# Patient Record
Sex: FEMALE | Race: WHITE
[De-identification: names, ages, dates, MRNs, and addresses within clinical notes are randomized per-mention and may not be internally consistent; named-entity substitution may affect disease eponyms.]

---

## 2017-04-17 ENCOUNTER — RX ONLY (OUTPATIENT)
Age: 62
Setting detail: RX ONLY
End: 2017-04-17

## 2017-04-17 RX ORDER — AZELAIC ACID 0.15 G/G
GEL TOPICAL
Qty: 50 | Refills: 5

## 2017-08-10 ENCOUNTER — APPOINTMENT (RX ONLY)
Dept: URBAN - METROPOLITAN AREA CLINIC 122 | Facility: CLINIC | Age: 62
Setting detail: DERMATOLOGY
End: 2017-08-10

## 2017-08-10 DIAGNOSIS — B35.1 TINEA UNGUIUM: ICD-10-CM

## 2017-08-10 DIAGNOSIS — L71.8 OTHER ROSACEA: ICD-10-CM

## 2017-08-10 PROCEDURE — 99213 OFFICE O/P EST LOW 20 MIN: CPT

## 2017-08-10 PROCEDURE — ? PRESCRIPTION

## 2017-08-10 PROCEDURE — ? TREATMENT REGIMEN

## 2017-08-10 RX ORDER — TERBINAFINE HCL 250 MG
TABLET ORAL
Qty: 30 | Refills: 2 | Status: ERX | COMMUNITY
Start: 2017-08-10

## 2017-08-10 RX ADMIN — Medication 1: at 00:00

## 2018-08-13 ENCOUNTER — APPOINTMENT (RX ONLY)
Dept: URBAN - METROPOLITAN AREA CLINIC 122 | Facility: CLINIC | Age: 63
Setting detail: DERMATOLOGY
End: 2018-08-13

## 2018-08-13 DIAGNOSIS — L71.8 OTHER ROSACEA: ICD-10-CM

## 2018-08-13 DIAGNOSIS — B35.1 TINEA UNGUIUM: ICD-10-CM | Status: RESOLVED

## 2018-08-13 DIAGNOSIS — L82.1 OTHER SEBORRHEIC KERATOSIS: ICD-10-CM

## 2018-08-13 PROBLEM — E78.5 HYPERLIPIDEMIA, UNSPECIFIED: Status: ACTIVE | Noted: 2018-08-13

## 2018-08-13 PROBLEM — L70.0 ACNE VULGARIS: Status: ACTIVE | Noted: 2018-08-13

## 2018-08-13 PROBLEM — L29.8 OTHER PRURITUS: Status: ACTIVE | Noted: 2018-08-13

## 2018-08-13 PROCEDURE — ? TREATMENT REGIMEN

## 2018-08-13 PROCEDURE — 99213 OFFICE O/P EST LOW 20 MIN: CPT

## 2018-08-13 PROCEDURE — ? COUNSELING

## 2018-08-13 ASSESSMENT — LOCATION SIMPLE DESCRIPTION DERM
LOCATION SIMPLE: CHEST
LOCATION SIMPLE: RIGHT UPPER ARM
LOCATION SIMPLE: RIGHT FOREHEAD

## 2018-08-13 ASSESSMENT — LOCATION DETAILED DESCRIPTION DERM
LOCATION DETAILED: LEFT LATERAL SUPERIOR CHEST
LOCATION DETAILED: RIGHT ANTERIOR PROXIMAL UPPER ARM
LOCATION DETAILED: RIGHT FOREHEAD

## 2018-08-13 ASSESSMENT — LOCATION ZONE DERM
LOCATION ZONE: ARM
LOCATION ZONE: TRUNK
LOCATION ZONE: FACE

## 2019-08-13 ENCOUNTER — APPOINTMENT (RX ONLY)
Dept: URBAN - METROPOLITAN AREA CLINIC 122 | Facility: CLINIC | Age: 64
Setting detail: DERMATOLOGY
End: 2019-08-13

## 2019-08-13 DIAGNOSIS — D49.2 NEOPLASM OF UNSPECIFIED BEHAVIOR OF BONE, SOFT TISSUE, AND SKIN: ICD-10-CM

## 2019-08-13 DIAGNOSIS — B35.1 TINEA UNGUIUM: ICD-10-CM

## 2019-08-13 DIAGNOSIS — L71.8 OTHER ROSACEA: ICD-10-CM

## 2019-08-13 PROBLEM — J30.1 ALLERGIC RHINITIS DUE TO POLLEN: Status: ACTIVE | Noted: 2019-08-13

## 2019-08-13 PROBLEM — I48.91 UNSPECIFIED ATRIAL FIBRILLATION: Status: ACTIVE | Noted: 2019-08-13

## 2019-08-13 PROBLEM — E78.5 HYPERLIPIDEMIA, UNSPECIFIED: Status: ACTIVE | Noted: 2019-08-13

## 2019-08-13 PROBLEM — L29.8 OTHER PRURITUS: Status: ACTIVE | Noted: 2019-08-13

## 2019-08-13 PROCEDURE — ? TREATMENT REGIMEN

## 2019-08-13 PROCEDURE — ? SHAVE REMOVAL

## 2019-08-13 PROCEDURE — 99213 OFFICE O/P EST LOW 20 MIN: CPT | Mod: 25

## 2019-08-13 PROCEDURE — ? PRESCRIPTION

## 2019-08-13 PROCEDURE — 11310 SHAVE SKIN LESION 0.5 CM/<: CPT

## 2019-08-13 RX ORDER — TERBINAFINE HYDROCHLORIDE 250 MG/1
ONE TABLET ORAL DAILY
Qty: 30 | Refills: 2 | Status: ERX

## 2019-08-13 ASSESSMENT — LOCATION ZONE DERM: LOCATION ZONE: FACE

## 2019-08-13 ASSESSMENT — LOCATION SIMPLE DESCRIPTION DERM: LOCATION SIMPLE: RIGHT EYEBROW

## 2019-08-13 ASSESSMENT — LOCATION DETAILED DESCRIPTION DERM: LOCATION DETAILED: RIGHT CENTRAL EYEBROW

## 2019-08-13 NOTE — HPI: EVALUATION OF SKIN LESION(S)
What Type Of Note Output Would You Prefer (Optional)?: Standard Output
How Severe Are Your Spot(S)?: mild
Have Your Spot(S) Been Treated In The Past?: has not been treated
Hpi Title: Evaluation of Skin Lesions
Additional History: Place on right eyebrow enlarging.

## 2019-08-13 NOTE — PROCEDURE: SHAVE REMOVAL
Was A Bandage Applied: Yes
Wound Care: No ointment
Bill 12020 For Specimen Handling/Conveyance To Laboratory?: no
Lab Facility: [id number]
Notification Instructions: Patient will be notified of biopsy results. However, patient instructed to call the office if not contacted within 2 weeks.
Medical Necessity Information: It is in your best interest to select a reason for this procedure from the list below. All of these items fulfill various CMS LCD requirements except the new and changing color options.
Path Notes (To The Dermatopathologist): Emilia
Anesthesia Type: 1% lidocaine without epinephrine and a 1:10 solution of 8.4% sodium bicarbonate
Biopsy Method: scissors
Lab: [id number]
Anesthesia Volume In Cc: 1.3
Billing Type: Third-Party Bill
Post-Care Instructions: I reviewed with the patient in detail post-care instructions. Patient is to keep the biopsy site dry overnight, and then apply bacitracin twice daily until healed. Patient may apply hydrogen peroxide soaks to remove any crusting.ADVISED AREA ON CHEST PRONE TO SCARRING
Size Of Lesion In Cm (Required): 0.2
Medical Necessity Clause: This procedure was medically necessary,advised could regrow
Hemostasis: Ren's
Detail Level: Detailed
X Size Of Lesion In Cm (Optional): 0
Consent was obtained from the patient. The risks and benefits to therapy were discussed in detail. Specifically, the risks of infection, scarring, bleeding, prolonged wound healing, incomplete removal, allergy to anesthesia, nerve injury and recurrence were addressed. Prior to the procedure, the treatment site was clearly identified and confirmed by the patient. All components of Universal Protocol/PAUSE Rule completed.

## 2020-08-14 ENCOUNTER — APPOINTMENT (RX ONLY)
Dept: URBAN - METROPOLITAN AREA CLINIC 83 | Facility: CLINIC | Age: 65
Setting detail: DERMATOLOGY
End: 2020-08-14

## 2020-08-14 DIAGNOSIS — L85.1 ACQUIRED KERATOSIS [KERATODERMA] PALMARIS ET PLANTARIS: ICD-10-CM

## 2020-08-14 DIAGNOSIS — L71.8 OTHER ROSACEA: ICD-10-CM

## 2020-08-14 PROCEDURE — ? TREATMENT REGIMEN

## 2020-08-14 PROCEDURE — 99213 OFFICE O/P EST LOW 20 MIN: CPT

## 2020-08-14 NOTE — PROCEDURE: TREATMENT REGIMEN
Initiate Treatment: 40% urea to thick nails
Detail Level: Simple
Continue Regimen: Azaleic acid 15% bid

## 2021-08-16 ENCOUNTER — APPOINTMENT (RX ONLY)
Dept: URBAN - METROPOLITAN AREA CLINIC 83 | Facility: CLINIC | Age: 66
Setting detail: DERMATOLOGY
End: 2021-08-16

## 2021-08-16 DIAGNOSIS — L82.1 OTHER SEBORRHEIC KERATOSIS: ICD-10-CM

## 2021-08-16 DIAGNOSIS — L60.3 NAIL DYSTROPHY: ICD-10-CM

## 2021-08-16 DIAGNOSIS — L82.0 INFLAMED SEBORRHEIC KERATOSIS: ICD-10-CM

## 2021-08-16 DIAGNOSIS — L71.8 OTHER ROSACEA: ICD-10-CM | Status: WELL CONTROLLED

## 2021-08-16 PROCEDURE — ? BENIGN DESTRUCTION

## 2021-08-16 PROCEDURE — ? TREATMENT REGIMEN

## 2021-08-16 PROCEDURE — 99213 OFFICE O/P EST LOW 20 MIN: CPT | Mod: 25

## 2021-08-16 PROCEDURE — 17110 DESTRUCTION B9 LES UP TO 14: CPT

## 2021-08-16 ASSESSMENT — LOCATION SIMPLE DESCRIPTION DERM
LOCATION SIMPLE: RIGHT CHEEK
LOCATION SIMPLE: LEFT 3RD TOE
LOCATION SIMPLE: LEFT UPPER BACK
LOCATION SIMPLE: RIGHT 3RD TOE

## 2021-08-16 ASSESSMENT — LOCATION ZONE DERM
LOCATION ZONE: TOENAIL
LOCATION ZONE: FACE
LOCATION ZONE: TRUNK

## 2021-08-16 ASSESSMENT — LOCATION DETAILED DESCRIPTION DERM
LOCATION DETAILED: LEFT 3RD TOENAIL
LOCATION DETAILED: LEFT SUPERIOR MEDIAL UPPER BACK
LOCATION DETAILED: RIGHT CENTRAL MALAR CHEEK
LOCATION DETAILED: RIGHT 3RD TOENAIL

## 2021-08-16 NOTE — PROCEDURE: BENIGN DESTRUCTION
Detail Level: Simple
Medical Necessity Information: It is in your best interest to select a reason for this procedure from the list below. All of these items fulfill various CMS LCD requirements except the new and changing color options.
Total Number Of Lesions Treated: 1
Anesthesia Volume In Cc: 0.5
Render Post-Care Instructions In Note?: no
Post-Care Instructions: I reviewed with the patient in detail post-care instructions. Patient is to wear sunprotection, and avoid picking at any of the treated lesions. Pt may apply Vaseline to crusted or scabbing areas.
Consent: The patient's consent was obtained including but not limited to risks of crusting, scabbing, blistering, scarring, darker or lighter pigmentary change, recurrence, incomplete removal and infection.
Medical Necessity Clause: This procedure was medically necessary because the lesions that were treated were:

## 2021-08-16 NOTE — HPI: EVALUATION OF SKIN LESION(S)
What Type Of Note Output Would You Prefer (Optional)?: Standard Output
How Severe Are Your Spot(S)?: mild
Have Your Spot(S) Been Treated In The Past?: has not been treated
Hpi Title: Evaluation of Skin Lesions
Additional History: No new places.

## 2022-08-15 ENCOUNTER — APPOINTMENT (RX ONLY)
Dept: URBAN - METROPOLITAN AREA CLINIC 83 | Facility: CLINIC | Age: 67
Setting detail: DERMATOLOGY
End: 2022-08-15

## 2022-08-15 DIAGNOSIS — L57.8 OTHER SKIN CHANGES DUE TO CHRONIC EXPOSURE TO NONIONIZING RADIATION: ICD-10-CM

## 2022-08-15 DIAGNOSIS — L71.8 OTHER ROSACEA: ICD-10-CM

## 2022-08-15 DIAGNOSIS — L60.3 NAIL DYSTROPHY: ICD-10-CM

## 2022-08-15 PROCEDURE — 99213 OFFICE O/P EST LOW 20 MIN: CPT

## 2022-08-15 PROCEDURE — ? SUNSCREEN RECOMMENDATIONS

## 2022-08-15 PROCEDURE — ? TREATMENT REGIMEN

## 2022-08-15 NOTE — PROCEDURE: TREATMENT REGIMEN
Detail Level: Simple
Continue Regimen: Finacea 15% bid, not using mirvaso
Plan: File nail,then use tolycyn, consider nail avulsion

## 2022-08-15 NOTE — HPI: EVALUATION OF SKIN LESION(S)
What Type Of Note Output Would You Prefer (Optional)?: Standard Output
How Severe Are Your Spot(S)?: mild
Have Your Spot(S) Been Treated In The Past?: has not been treated
Hpi Title: Evaluation of Skin Lesions
Additional History: Yearly check back.

## 2023-03-27 ENCOUNTER — RX ONLY (OUTPATIENT)
Age: 68
Setting detail: RX ONLY
End: 2023-03-27

## 2023-03-27 RX ORDER — AZELAIC ACID 0.15 G/G
GEL TOPICAL DAILY
Qty: 50 | Refills: 0 | Status: ERX | COMMUNITY
Start: 2023-03-27

## 2023-03-28 ENCOUNTER — RX ONLY (OUTPATIENT)
Age: 68
Setting detail: RX ONLY
End: 2023-03-28

## 2023-03-28 RX ORDER — METRONIDAZOLE 7.5 MG/G
PEA SIZE AMOUNT GEL TOPICAL BID
Qty: 45 | Refills: 1 | Status: ERX | COMMUNITY
Start: 2023-03-28

## 2023-08-14 ENCOUNTER — APPOINTMENT (RX ONLY)
Dept: URBAN - METROPOLITAN AREA CLINIC 83 | Facility: CLINIC | Age: 68
Setting detail: DERMATOLOGY
End: 2023-08-14

## 2023-08-14 DIAGNOSIS — L72.8 OTHER FOLLICULAR CYSTS OF THE SKIN AND SUBCUTANEOUS TISSUE: ICD-10-CM

## 2023-08-14 DIAGNOSIS — B35.1 TINEA UNGUIUM: ICD-10-CM

## 2023-08-14 DIAGNOSIS — L71.8 OTHER ROSACEA: ICD-10-CM

## 2023-08-14 PROCEDURE — 99213 OFFICE O/P EST LOW 20 MIN: CPT | Mod: 25

## 2023-08-14 PROCEDURE — 17110 DESTRUCTION B9 LES UP TO 14: CPT

## 2023-08-14 PROCEDURE — ? PRESCRIPTION

## 2023-08-14 PROCEDURE — ? BENIGN DESTRUCTION

## 2023-08-14 PROCEDURE — ? TREATMENT REGIMEN

## 2023-08-14 RX ORDER — ITRACONAZOLE 100 MG/1
CAPSULE ORAL
Qty: 90 | Refills: 0 | Status: ERX | COMMUNITY
Start: 2023-08-14

## 2023-08-14 RX ADMIN — ITRACONAZOLE: 100 CAPSULE ORAL at 00:00

## 2023-08-14 ASSESSMENT — LOCATION ZONE DERM: LOCATION ZONE: TRUNK

## 2023-08-14 ASSESSMENT — LOCATION SIMPLE DESCRIPTION DERM: LOCATION SIMPLE: CHEST

## 2023-08-14 ASSESSMENT — LOCATION DETAILED DESCRIPTION DERM: LOCATION DETAILED: LEFT MEDIAL SUPERIOR CHEST

## 2023-08-14 NOTE — HPI: SKIN LESION
Is This A New Presentation, Or A Follow-Up?: Skin Lesions
What Type Of Note Output Would You Prefer (Optional)?: Standard Output
How Severe Is Your Skin Lesion?: mild
Has Your Skin Lesion Been Treated?: not been treated
Additional History: Upper torso exam only

## 2023-08-14 NOTE — PROCEDURE: BENIGN DESTRUCTION
Medical Necessity Information: It is in your best interest to select a reason for this procedure from the list below. All of these items fulfill various CMS LCD requirements except the new and changing color options.
Anesthesia Volume In Cc: 0.5
Post-Care Instructions: I reviewed with the patient in detail post-care instructions. Patient is to wear sunprotection, and avoid picking at any of the treated lesions. Pt may apply Vaseline to crusted or scabbing areas.
Render Post-Care Instructions In Note?: no
Treatment Number (Will Not Render If 0): 0
Consent: The patient's consent was obtained including but not limited to risks of crusting, scabbing, blistering, scarring, darker or lighter pigmentary change, recurrence, incomplete removal and infection.
Medical Necessity Clause: This procedure was medically necessary because the lesions that were treated were:
Detail Level: Simple

## 2024-08-12 ENCOUNTER — RX ONLY (OUTPATIENT)
Age: 69
Setting detail: RX ONLY
End: 2024-08-12

## 2024-08-12 ENCOUNTER — APPOINTMENT (RX ONLY)
Dept: URBAN - METROPOLITAN AREA CLINIC 83 | Facility: CLINIC | Age: 69
Setting detail: DERMATOLOGY
End: 2024-08-12

## 2024-08-12 DIAGNOSIS — L57.0 ACTINIC KERATOSIS: ICD-10-CM

## 2024-08-12 DIAGNOSIS — L57.8 OTHER SKIN CHANGES DUE TO CHRONIC EXPOSURE TO NONIONIZING RADIATION: ICD-10-CM

## 2024-08-12 DIAGNOSIS — L71.8 OTHER ROSACEA: ICD-10-CM

## 2024-08-12 DIAGNOSIS — B35.1 TINEA UNGUIUM: ICD-10-CM

## 2024-08-12 PROCEDURE — 17000 DESTRUCT PREMALG LESION: CPT

## 2024-08-12 PROCEDURE — ? LIQUID NITROGEN

## 2024-08-12 PROCEDURE — 99214 OFFICE O/P EST MOD 30 MIN: CPT | Mod: 25

## 2024-08-12 PROCEDURE — ? TREATMENT REGIMEN

## 2024-08-12 PROCEDURE — ? PRESCRIPTION

## 2024-08-12 PROCEDURE — ? SUNSCREEN RECOMMENDATIONS

## 2024-08-12 RX ORDER — AZELAIC ACID 0.15 G/G
GEL TOPICAL BID
Qty: 50 | Refills: 6 | Status: ERX

## 2024-08-12 RX ORDER — CICLOPIROX 80 MG/ML
SOLUTION TOPICAL DAILY
Qty: 6.6 | Refills: 6 | Status: ERX | COMMUNITY
Start: 2024-08-12

## 2024-08-12 RX ADMIN — CICLOPIROX: 80 SOLUTION TOPICAL at 00:00

## 2024-08-12 ASSESSMENT — LOCATION SIMPLE DESCRIPTION DERM: LOCATION SIMPLE: CHEST

## 2024-08-12 ASSESSMENT — LOCATION DETAILED DESCRIPTION DERM: LOCATION DETAILED: RIGHT MEDIAL SUPERIOR CHEST

## 2024-08-12 ASSESSMENT — LOCATION ZONE DERM: LOCATION ZONE: TRUNK

## 2024-08-12 NOTE — PROCEDURE: LIQUID NITROGEN
Consent: The patient's consent was obtained including but not limited to risks of crusting, scabbing, blistering, scarring, darker or lighter pigmentary change, recurrence, incomplete removal and infection.
Post-Care Instructions: I reviewed with the patient in detail post-care instructions. Patient is to wear sunprotection, and avoid picking at any of the treated lesions. Pt may apply Vaseline to crusted or scabbing areas.
Render In Bullet Format When Appropriate: No
Detail Level: Simple
Total Number Of Aks Treated: 1

## 2024-08-12 NOTE — HPI: EVALUATION OF SKIN LESION(S)
What Type Of Note Output Would You Prefer (Optional)?: Standard Output
How Severe Are Your Spot(S)?: mild
Have Your Spot(S) Been Treated In The Past?: has not been treated
Hpi Title: Evaluation of a Skin Lesion
Additional History: Red itchy place on right chest.

## 2024-08-14 ENCOUNTER — RX ONLY (OUTPATIENT)
Age: 69
Setting detail: RX ONLY
End: 2024-08-14

## 2024-08-14 RX ORDER — AZELAIC ACID 0.15 G/G
GEL TOPICAL BID
Qty: 50 | Refills: 6 | Status: ERX

## 2025-08-11 ENCOUNTER — APPOINTMENT (OUTPATIENT)
Dept: URBAN - METROPOLITAN AREA CLINIC 83 | Facility: CLINIC | Age: 70
Setting detail: DERMATOLOGY
End: 2025-08-11

## 2025-08-11 DIAGNOSIS — B35.1 TINEA UNGUIUM: ICD-10-CM

## 2025-08-11 DIAGNOSIS — L71.8 OTHER ROSACEA: ICD-10-CM | Status: WELL CONTROLLED

## 2025-08-11 DIAGNOSIS — L57.8 OTHER SKIN CHANGES DUE TO CHRONIC EXPOSURE TO NONIONIZING RADIATION: ICD-10-CM

## 2025-08-11 DIAGNOSIS — L82.0 INFLAMED SEBORRHEIC KERATOSIS: ICD-10-CM

## 2025-08-11 PROCEDURE — ? BENIGN DESTRUCTION

## 2025-08-11 PROCEDURE — ? TREATMENT REGIMEN

## 2025-08-11 PROCEDURE — ? PRESCRIPTION

## 2025-08-11 PROCEDURE — ? SUNSCREEN RECOMMENDATIONS

## 2025-08-11 RX ORDER — EFINACONAZOLE 100 MG/ML
SOLUTION TOPICAL DAILY
Qty: 8 | Refills: 5 | Status: ERX | COMMUNITY
Start: 2025-08-11

## 2025-08-11 RX ADMIN — EFINACONAZOLE: 100 SOLUTION TOPICAL at 00:00

## 2025-08-11 ASSESSMENT — LOCATION ZONE DERM: LOCATION ZONE: ARM

## 2025-08-11 ASSESSMENT — LOCATION DETAILED DESCRIPTION DERM: LOCATION DETAILED: RIGHT PROXIMAL DORSAL FOREARM

## 2025-08-11 ASSESSMENT — LOCATION SIMPLE DESCRIPTION DERM: LOCATION SIMPLE: RIGHT FOREARM
